# Patient Record
Sex: FEMALE | Race: WHITE | NOT HISPANIC OR LATINO | URBAN - METROPOLITAN AREA
[De-identification: names, ages, dates, MRNs, and addresses within clinical notes are randomized per-mention and may not be internally consistent; named-entity substitution may affect disease eponyms.]

---

## 2017-07-27 ENCOUNTER — OUTPATIENT (OUTPATIENT)
Dept: OUTPATIENT SERVICES | Facility: HOSPITAL | Age: 47
LOS: 1 days | End: 2017-07-27
Payer: MEDICAID

## 2017-07-27 VITALS
OXYGEN SATURATION: 100 % | HEIGHT: 64 IN | SYSTOLIC BLOOD PRESSURE: 130 MMHG | HEART RATE: 80 BPM | WEIGHT: 197.98 LBS | DIASTOLIC BLOOD PRESSURE: 82 MMHG | TEMPERATURE: 98 F

## 2017-07-27 DIAGNOSIS — N84.1 POLYP OF CERVIX UTERI: ICD-10-CM

## 2017-07-27 DIAGNOSIS — N84.0 POLYP OF CORPUS UTERI: ICD-10-CM

## 2017-07-27 DIAGNOSIS — Z98.890 OTHER SPECIFIED POSTPROCEDURAL STATES: Chronic | ICD-10-CM

## 2017-07-27 DIAGNOSIS — N92.1 EXCESSIVE AND FREQUENT MENSTRUATION WITH IRREGULAR CYCLE: ICD-10-CM

## 2017-07-27 DIAGNOSIS — Z01.818 ENCOUNTER FOR OTHER PREPROCEDURAL EXAMINATION: ICD-10-CM

## 2017-07-27 LAB
ANION GAP SERPL CALC-SCNC: 15 MMOL/L — SIGNIFICANT CHANGE UP (ref 5–17)
BUN SERPL-MCNC: 19 MG/DL — SIGNIFICANT CHANGE UP (ref 7–23)
CALCIUM SERPL-MCNC: 9.3 MG/DL — SIGNIFICANT CHANGE UP (ref 8.4–10.5)
CHLORIDE SERPL-SCNC: 101 MMOL/L — SIGNIFICANT CHANGE UP (ref 96–108)
CO2 SERPL-SCNC: 21 MMOL/L — LOW (ref 22–31)
CREAT SERPL-MCNC: 0.89 MG/DL — SIGNIFICANT CHANGE UP (ref 0.5–1.3)
GLUCOSE SERPL-MCNC: 75 MG/DL — SIGNIFICANT CHANGE UP (ref 70–99)
HCT VFR BLD CALC: 37.8 % — SIGNIFICANT CHANGE UP (ref 34.5–45)
HGB BLD-MCNC: 12.4 G/DL — SIGNIFICANT CHANGE UP (ref 11.5–15.5)
MCHC RBC-ENTMCNC: 29.5 PG — SIGNIFICANT CHANGE UP (ref 27–34)
MCHC RBC-ENTMCNC: 32.8 GM/DL — SIGNIFICANT CHANGE UP (ref 32–36)
MCV RBC AUTO: 89.8 FL — SIGNIFICANT CHANGE UP (ref 80–100)
PLATELET # BLD AUTO: 330 K/UL — SIGNIFICANT CHANGE UP (ref 150–400)
POTASSIUM SERPL-MCNC: 4.2 MMOL/L — SIGNIFICANT CHANGE UP (ref 3.5–5.3)
POTASSIUM SERPL-SCNC: 4.2 MMOL/L — SIGNIFICANT CHANGE UP (ref 3.5–5.3)
RBC # BLD: 4.21 M/UL — SIGNIFICANT CHANGE UP (ref 3.8–5.2)
RBC # FLD: 13.5 % — SIGNIFICANT CHANGE UP (ref 10.3–14.5)
SODIUM SERPL-SCNC: 137 MMOL/L — SIGNIFICANT CHANGE UP (ref 135–145)
WBC # BLD: 8.97 K/UL — SIGNIFICANT CHANGE UP (ref 3.8–10.5)
WBC # FLD AUTO: 8.97 K/UL — SIGNIFICANT CHANGE UP (ref 3.8–10.5)

## 2017-07-27 PROCEDURE — 85027 COMPLETE CBC AUTOMATED: CPT

## 2017-07-27 PROCEDURE — 80048 BASIC METABOLIC PNL TOTAL CA: CPT

## 2017-07-27 PROCEDURE — G0463: CPT

## 2017-07-27 RX ORDER — LIDOCAINE HCL 20 MG/ML
0.2 VIAL (ML) INJECTION ONCE
Qty: 0 | Refills: 0 | Status: DISCONTINUED | OUTPATIENT
Start: 2017-08-10 | End: 2017-08-25

## 2017-07-27 RX ORDER — SODIUM CHLORIDE 9 MG/ML
3 INJECTION INTRAMUSCULAR; INTRAVENOUS; SUBCUTANEOUS EVERY 8 HOURS
Qty: 0 | Refills: 0 | Status: DISCONTINUED | OUTPATIENT
Start: 2017-08-10 | End: 2017-08-25

## 2017-07-27 NOTE — H&P PST ADULT - ATTENDING COMMENTS
reviewed risk of procedure including but not limited to perforation, failure, damage to adjacent organs, bleeding and infection

## 2017-07-27 NOTE — H&P PST ADULT - PMH
Obesity (BMI 30-39.9)    Uterine polyp Asthma  In past- requires no treatment presently  Obesity (BMI 30-39.9)    Uterine polyp

## 2017-07-27 NOTE — H&P PST ADULT - PROBLEM SELECTOR PLAN 1
Pt is scheduled for a D&C, hysteroscopy, polypectomy and endometrial ablation. NPO after MN except Pepcid in AM DOS

## 2017-07-27 NOTE — H&P PST ADULT - NSANTHOSAYNRD_GEN_A_CORE
No. PITER screening performed.  STOP BANG Legend: 0-2 = LOW Risk; 3-4 = INTERMEDIATE Risk; 5-8 = HIGH Risk

## 2017-07-27 NOTE — H&P PST ADULT - HISTORY OF PRESENT ILLNESS
Pt. is a 47 y.o. WF  with LMP 2017. Pt. states she has had several month h/o heavy vaginal bleeding. Workup including hysterosono revealed a uterine polyp. Pt is scheduled for a D&C, diagnostic hysteroscopy, polypectomy and an endometrial ablation with Dr. Mahan on 8/10/2017.

## 2017-08-09 ENCOUNTER — TRANSCRIPTION ENCOUNTER (OUTPATIENT)
Age: 47
End: 2017-08-09

## 2017-08-10 ENCOUNTER — OUTPATIENT (OUTPATIENT)
Dept: OUTPATIENT SERVICES | Facility: HOSPITAL | Age: 47
LOS: 1 days | End: 2017-08-10
Payer: MEDICAID

## 2017-08-10 VITALS
OXYGEN SATURATION: 100 % | WEIGHT: 197.98 LBS | DIASTOLIC BLOOD PRESSURE: 82 MMHG | HEART RATE: 80 BPM | HEIGHT: 64 IN | SYSTOLIC BLOOD PRESSURE: 143 MMHG | TEMPERATURE: 98 F

## 2017-08-10 VITALS
RESPIRATION RATE: 14 BRPM | TEMPERATURE: 96 F | HEART RATE: 76 BPM | SYSTOLIC BLOOD PRESSURE: 140 MMHG | DIASTOLIC BLOOD PRESSURE: 86 MMHG

## 2017-08-10 DIAGNOSIS — N92.1 EXCESSIVE AND FREQUENT MENSTRUATION WITH IRREGULAR CYCLE: ICD-10-CM

## 2017-08-10 DIAGNOSIS — Z01.818 ENCOUNTER FOR OTHER PREPROCEDURAL EXAMINATION: ICD-10-CM

## 2017-08-10 DIAGNOSIS — N84.1 POLYP OF CERVIX UTERI: ICD-10-CM

## 2017-08-10 DIAGNOSIS — Z98.890 OTHER SPECIFIED POSTPROCEDURAL STATES: Chronic | ICD-10-CM

## 2017-08-10 PROCEDURE — 58563 HYSTEROSCOPY ABLATION: CPT

## 2017-08-10 PROCEDURE — 88305 TISSUE EXAM BY PATHOLOGIST: CPT

## 2017-08-10 PROCEDURE — 88305 TISSUE EXAM BY PATHOLOGIST: CPT | Mod: 26

## 2017-08-10 RX ORDER — ONDANSETRON 8 MG/1
4 TABLET, FILM COATED ORAL ONCE
Qty: 0 | Refills: 0 | Status: DISCONTINUED | OUTPATIENT
Start: 2017-08-10 | End: 2017-08-25

## 2017-08-10 RX ORDER — CELECOXIB 200 MG/1
200 CAPSULE ORAL ONCE
Qty: 0 | Refills: 0 | Status: DISCONTINUED | OUTPATIENT
Start: 2017-08-10 | End: 2017-08-25

## 2017-08-10 RX ORDER — OXYCODONE HYDROCHLORIDE 5 MG/1
5 TABLET ORAL ONCE
Qty: 0 | Refills: 0 | Status: DISCONTINUED | OUTPATIENT
Start: 2017-08-10 | End: 2017-08-10

## 2017-08-10 RX ORDER — CELECOXIB 200 MG/1
200 CAPSULE ORAL ONCE
Qty: 0 | Refills: 0 | Status: COMPLETED | OUTPATIENT
Start: 2017-08-10 | End: 2017-08-10

## 2017-08-10 RX ORDER — ACETAMINOPHEN 500 MG
975 TABLET ORAL ONCE
Qty: 0 | Refills: 0 | Status: COMPLETED | OUTPATIENT
Start: 2017-08-10 | End: 2017-08-10

## 2017-08-10 RX ORDER — SODIUM CHLORIDE 9 MG/ML
1000 INJECTION, SOLUTION INTRAVENOUS
Qty: 0 | Refills: 0 | Status: DISCONTINUED | OUTPATIENT
Start: 2017-08-10 | End: 2017-08-25

## 2017-08-10 RX ORDER — FAMOTIDINE 10 MG/ML
0 INJECTION INTRAVENOUS
Qty: 0 | Refills: 0 | COMMUNITY

## 2017-08-10 RX ADMIN — CELECOXIB 200 MILLIGRAM(S): 200 CAPSULE ORAL at 09:53

## 2017-08-10 NOTE — ASU DISCHARGE PLAN (ADULT/PEDIATRIC). - NOTIFY
Bleeding that does not stop/Unable to Urinate/GYN Fever>100.4/Pain not relieved by Medications/Inability to Tolerate Liquids or Foods

## 2017-08-10 NOTE — BRIEF OPERATIVE NOTE - POST-OP DX
Endometrial polyp  08/10/2017    Active  Mehdi [place]ro  Excessive and frequent menstruation with irregular cycle  08/10/2017    Active  Mehdi [place]

## 2017-08-10 NOTE — ASU DISCHARGE PLAN (ADULT/PEDIATRIC). - ITEMS TO FOLLOWUP WITH YOUR PHYSICIAN'S
Regular diet.  Resume normal activity as tolerated. Follow up per Dr. Mahan.  .  Nothing per vagina such as tampons, intercourse, douches or tub baths for 2 weeks or until you see your doctor.  Call your doctor with any signs and symptoms of infection such as fever, chills, nausea or vomiting.  Call your doctor if you're unable to tolerate food, increase in vaginal bleeding or have difficulty urinating.  Call your doctor if you have pain that is not relieved by your prescribed medications.  Notify your doctor with any other concerns.

## 2017-08-10 NOTE — BRIEF OPERATIVE NOTE - PROCEDURE
Ablation of endometrium  08/10/2017    Active  [facility]  Polypectomy  08/10/2017    Active  [email or web address]  Dilation and curettage  08/10/2017    Active  [email or web address]  Diagnostic hysteroscopy for abnormal uterine bleeding  08/10/2017    Active  [email or web address]  Exam under anesthesia  08/10/2017    Active  [email or web address]

## 2017-08-11 LAB — SURGICAL PATHOLOGY STUDY: SIGNIFICANT CHANGE UP

## 2020-07-02 NOTE — H&P PST ADULT - NEUROLOGICAL
Patient on Mirtazapine 15 mg at home   - will resume and monitor patient given h/o fall negative Alert & oriented; no sensory, motor or coordination deficits, normal reflexes

## 2020-09-29 PROBLEM — E66.9 OBESITY, UNSPECIFIED: Chronic | Status: ACTIVE | Noted: 2017-07-27

## 2020-09-29 PROBLEM — J45.909 UNSPECIFIED ASTHMA, UNCOMPLICATED: Chronic | Status: ACTIVE | Noted: 2017-07-27

## 2020-09-29 PROBLEM — N84.0 POLYP OF CORPUS UTERI: Chronic | Status: ACTIVE | Noted: 2017-07-27

## 2024-06-19 PROBLEM — Z00.00 ENCOUNTER FOR PREVENTIVE HEALTH EXAMINATION: Status: ACTIVE | Noted: 2024-06-19

## 2024-06-20 ENCOUNTER — APPOINTMENT (OUTPATIENT)
Dept: ORTHOPEDIC SURGERY | Facility: CLINIC | Age: 54
End: 2024-06-20
Payer: OTHER MISCELLANEOUS

## 2024-06-20 ENCOUNTER — TRANSCRIPTION ENCOUNTER (OUTPATIENT)
Age: 54
End: 2024-06-20

## 2024-06-20 DIAGNOSIS — S62.235A OTHER NONDISPLACED FRACTURE OF BASE OF FIRST METACARPAL BONE, LEFT HAND, INITIAL ENCOUNTER FOR CLOSED FRACTURE: ICD-10-CM

## 2024-06-20 PROCEDURE — 99203 OFFICE O/P NEW LOW 30 MIN: CPT | Mod: 57

## 2024-06-20 PROCEDURE — 99204 OFFICE O/P NEW MOD 45 MIN: CPT

## 2024-06-20 PROCEDURE — 26600 TREAT METACARPAL FRACTURE: CPT

## 2024-06-20 RX ORDER — AMLODIPINE BESYLATE 5 MG/1
TABLET ORAL
Refills: 0 | Status: ACTIVE | COMMUNITY

## 2024-06-20 RX ORDER — GEMFIBROZIL 600 MG/1
TABLET, FILM COATED ORAL
Refills: 0 | Status: ACTIVE | COMMUNITY

## 2024-06-20 NOTE — HISTORY OF PRESENT ILLNESS
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 Pt was seen at [facility] ED and was informed that she fractured her left hand 4th and 5th MC. RHD  PMH: HTN, Hyperlipidemia. Allergies: Amoxicillin (rash).

## 2024-06-20 NOTE — ASSESSMENT
[FreeTextEntry1] : The fracture was discussed with the patient at length.  We discussed that although there may be some imperfect alignment on XRay, the patient would likely do best with early motion exercises to minimize stiffness.  We discussed that mild displacement without rotation at the fracture site was well tolerated.  We discussed the importance of good function over good Xrays and that performing surgery may lead to unnecessary scar tissue formation.  We discussed the benefit of deisi strapping and early range of motion.  We did discuss the goals of surgery when indicated for malrotation or extreme flexion and what to expect.  The patient may benefit from therapy.  Risks of treatment include, but are not limited to persistent pain, stiffness, fracture displacement, need for future surgery, mal-union, non-union. All patient questions were answered. Patient expressed understanding and would like to proceed with the non operative treatment plan.  will also apply splint to the and to protect fracture site- strapping to fingers and splint to hand  The affected finger is noted to be clean and dry.  A deisi loop was applied to the affected finger over the middle phalanx. It was then strapped to the adjacent finger in overlapping fashion. Fit and pressure were assessed as the strapping was applied and stopped when the desired amount of support was achieved A splint was applied.  The importance of ice and elevation were discussed with the patient.  The risks were also discussed such as compartment syndrome and skin breakdown.  They were instructed to never put foreign objects down the splint.  Patients should call for increasing pain, worsening swelling, numbness, extremity discoloration, or any other concerns.  A splint was applied.  The importance of ice and elevation were discussed with the patient.  The risks were also discussed such as compartment syndrome and skin breakdown.  They were instructed to never put foreign objects down the splint.  Patients should call for increasing pain, worsening swelling, numbness, extremity discoloration, or any other concerns.  F/u in 2 weeks for xrays

## 2024-06-20 NOTE — IMAGING
[de-identified] : left hand- +swelling no deformity normal cascade farom with some pain NVID  review of outside rays from [facility] orthopedic 6/21/24- fracture base of 4/5 metacarpals min displacement

## 2024-06-27 ENCOUNTER — APPOINTMENT (OUTPATIENT)
Dept: ORTHOPEDIC SURGERY | Facility: CLINIC | Age: 54
End: 2024-06-27
Payer: OTHER MISCELLANEOUS

## 2024-06-27 DIAGNOSIS — S62.315A DISPLACED FRACTURE OF BASE OF FOURTH METACARPAL BONE, LEFT HAND, INITIAL ENCOUNTER FOR CLOSED FRACTURE: ICD-10-CM

## 2024-06-27 DIAGNOSIS — S62.317A DISPLACED FRACTURE OF BASE OF FIFTH METACARPAL BONE, LEFT HAND, INITIAL ENCOUNTER FOR CLOSED FRACTURE: ICD-10-CM

## 2024-06-27 PROCEDURE — 73130 X-RAY EXAM OF HAND: CPT | Mod: LT

## 2024-06-27 PROCEDURE — 99024 POSTOP FOLLOW-UP VISIT: CPT

## 2024-06-27 PROCEDURE — 99214 OFFICE O/P EST MOD 30 MIN: CPT

## 2024-07-15 ENCOUNTER — APPOINTMENT (OUTPATIENT)
Dept: ORTHOPEDIC SURGERY | Facility: CLINIC | Age: 54
End: 2024-07-15
Payer: OTHER MISCELLANEOUS

## 2024-07-15 DIAGNOSIS — S62.317A DISPLACED FRACTURE OF BASE OF FIFTH METACARPAL BONE, LEFT HAND, INITIAL ENCOUNTER FOR CLOSED FRACTURE: ICD-10-CM

## 2024-07-15 DIAGNOSIS — S62.315A DISPLACED FRACTURE OF BASE OF FOURTH METACARPAL BONE, LEFT HAND, INITIAL ENCOUNTER FOR CLOSED FRACTURE: ICD-10-CM

## 2024-07-15 PROCEDURE — 99024 POSTOP FOLLOW-UP VISIT: CPT

## 2024-07-15 PROCEDURE — 73130 X-RAY EXAM OF HAND: CPT | Mod: LT

## 2024-07-30 ENCOUNTER — APPOINTMENT (OUTPATIENT)
Dept: ORTHOPEDIC SURGERY | Facility: CLINIC | Age: 54
End: 2024-07-30
Payer: OTHER MISCELLANEOUS

## 2024-07-30 DIAGNOSIS — S62.315A DISPLACED FRACTURE OF BASE OF FOURTH METACARPAL BONE, LEFT HAND, INITIAL ENCOUNTER FOR CLOSED FRACTURE: ICD-10-CM

## 2024-07-30 DIAGNOSIS — S62.317A DISPLACED FRACTURE OF BASE OF FIFTH METACARPAL BONE, LEFT HAND, INITIAL ENCOUNTER FOR CLOSED FRACTURE: ICD-10-CM

## 2024-07-30 PROCEDURE — 99024 POSTOP FOLLOW-UP VISIT: CPT

## 2024-07-30 PROCEDURE — 73130 X-RAY EXAM OF HAND: CPT | Mod: LT

## 2024-07-30 NOTE — IMAGING
[de-identified] : left hand- ttp over base of 4th and 5th MC SF MP 0-80 normal cascade NVID  7/30/24: XR of left hand  No malrotation. Base of 4th and 5th metacarpal healed fractures.

## 2024-07-30 NOTE — ASSESSMENT
[FreeTextEntry1] : The patient was advised of the diagnosis. The natural history of the pathology was explained in full to the patient in layman's terms. All questions were answered. The risks and benefits of surgical and non-surgical treatment alternatives were explained in full to the patient.  - PT rx given for hand therapy - Stop deisi taping - Follow up in 6 weeks

## 2024-07-30 NOTE — HISTORY OF PRESENT ILLNESS
[de-identified] : WC DOI 6/19/24 New patient - left hand fx  7/30/24: Pt returns for follow up of left 4th and 5th base of metacarpal fx. She has been deisi taping and doing well.  7/15/24:  Pt has been wearing splint and deisi loops and is doing well.  6/27/24: Pt presents for follow up of left 4th and 5th base of metacarpal fx. Pt has been wearing splint, and states she has been having pain from the splint.   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 Pt was seen at [facility] ED and was informed that she fractured her left hand 4th and 5th MC. RHD  PMH: HTN, Hyperlipidemia. Allergies: Amoxicillin (rash).

## 2024-09-16 ENCOUNTER — APPOINTMENT (OUTPATIENT)
Dept: ORTHOPEDIC SURGERY | Facility: CLINIC | Age: 54
End: 2024-09-16
Payer: OTHER MISCELLANEOUS

## 2024-09-16 DIAGNOSIS — S62.315A DISPLACED FRACTURE OF BASE OF FOURTH METACARPAL BONE, LEFT HAND, INITIAL ENCOUNTER FOR CLOSED FRACTURE: ICD-10-CM

## 2024-09-16 DIAGNOSIS — S62.317A DISPLACED FRACTURE OF BASE OF FIFTH METACARPAL BONE, LEFT HAND, INITIAL ENCOUNTER FOR CLOSED FRACTURE: ICD-10-CM

## 2024-09-16 PROCEDURE — 99024 POSTOP FOLLOW-UP VISIT: CPT

## 2024-09-16 PROCEDURE — 73130 X-RAY EXAM OF HAND: CPT | Mod: LT

## 2024-09-16 NOTE — ASSESSMENT
[FreeTextEntry1] : The patient was advised of the diagnosis. The natural history of the pathology was explained in full to the patient in layman's terms. All questions were answered. The risks and benefits of surgical and non-surgical treatment alternatives were explained in full to the patient.  - PT rx given for hand therapy -f/u 3mos

## 2024-09-16 NOTE — IMAGING
[de-identified] : left hand- ttp over base of 4th and 5th MC SF MP 0-90, pip 0-90, dip 0-90 normal cascade NVID  XR of left hand 3 views taken today- Base of 4th and 5th metacarpal healed fractures.

## 2024-12-31 ENCOUNTER — APPOINTMENT (OUTPATIENT)
Dept: ORTHOPEDIC SURGERY | Facility: CLINIC | Age: 54
End: 2024-12-31
Payer: OTHER MISCELLANEOUS

## 2024-12-31 DIAGNOSIS — M25.642 STIFFNESS OF LEFT HAND, NOT ELSEWHERE CLASSIFIED: ICD-10-CM

## 2024-12-31 DIAGNOSIS — S62.315A DISPLACED FRACTURE OF BASE OF FOURTH METACARPAL BONE, LEFT HAND, INITIAL ENCOUNTER FOR CLOSED FRACTURE: ICD-10-CM

## 2024-12-31 DIAGNOSIS — S62.317A DISPLACED FRACTURE OF BASE OF FIFTH METACARPAL BONE, LEFT HAND, INITIAL ENCOUNTER FOR CLOSED FRACTURE: ICD-10-CM

## 2024-12-31 DIAGNOSIS — S62.235A OTHER NONDISPLACED FRACTURE OF BASE OF FIRST METACARPAL BONE, LEFT HAND, INITIAL ENCOUNTER FOR CLOSED FRACTURE: ICD-10-CM

## 2024-12-31 PROCEDURE — 99214 OFFICE O/P EST MOD 30 MIN: CPT

## 2025-04-29 ENCOUNTER — APPOINTMENT (OUTPATIENT)
Dept: ORTHOPEDIC SURGERY | Facility: CLINIC | Age: 55
End: 2025-04-29
Payer: OTHER MISCELLANEOUS

## 2025-04-29 DIAGNOSIS — S62.315A DISPLACED FRACTURE OF BASE OF FOURTH METACARPAL BONE, LEFT HAND, INITIAL ENCOUNTER FOR CLOSED FRACTURE: ICD-10-CM

## 2025-04-29 DIAGNOSIS — S62.235A OTHER NONDISPLACED FRACTURE OF BASE OF FIRST METACARPAL BONE, LEFT HAND, INITIAL ENCOUNTER FOR CLOSED FRACTURE: ICD-10-CM

## 2025-04-29 DIAGNOSIS — S62.317A DISPLACED FRACTURE OF BASE OF FIFTH METACARPAL BONE, LEFT HAND, INITIAL ENCOUNTER FOR CLOSED FRACTURE: ICD-10-CM

## 2025-04-29 DIAGNOSIS — M25.642 STIFFNESS OF LEFT HAND, NOT ELSEWHERE CLASSIFIED: ICD-10-CM

## 2025-04-29 PROCEDURE — 99243 OFF/OP CNSLTJ NEW/EST LOW 30: CPT

## 2025-05-02 ENCOUNTER — APPOINTMENT (OUTPATIENT)
Dept: ORTHOPEDIC SURGERY | Facility: CLINIC | Age: 55
End: 2025-05-02